# Patient Record
Sex: FEMALE | Race: BLACK OR AFRICAN AMERICAN | NOT HISPANIC OR LATINO | Employment: FULL TIME | URBAN - METROPOLITAN AREA
[De-identification: names, ages, dates, MRNs, and addresses within clinical notes are randomized per-mention and may not be internally consistent; named-entity substitution may affect disease eponyms.]

---

## 2018-10-15 ENCOUNTER — OFFICE VISIT (OUTPATIENT)
Dept: OBSTETRICS AND GYNECOLOGY | Facility: CLINIC | Age: 38
End: 2018-10-15

## 2018-10-15 VITALS
WEIGHT: 145 LBS | HEIGHT: 69 IN | DIASTOLIC BLOOD PRESSURE: 68 MMHG | BODY MASS INDEX: 21.48 KG/M2 | SYSTOLIC BLOOD PRESSURE: 120 MMHG

## 2018-10-15 DIAGNOSIS — Z01.419 WOMEN'S ANNUAL ROUTINE GYNECOLOGICAL EXAMINATION: Primary | ICD-10-CM

## 2018-10-15 PROCEDURE — 99395 PREV VISIT EST AGE 18-39: CPT | Performed by: OBSTETRICS & GYNECOLOGY

## 2018-10-15 NOTE — PROGRESS NOTES
"Subjective     Chief Complaint   Patient presents with   • Gynecologic Exam     Pap smear patient wants birth control       Deana Rios is a 38 y.o. year old  presenting to be seen for her annual exam.      Her LMP was Patient's last menstrual period was 10/04/2018 (exact date)..  Her cycles are regular, predictable and consistent every 28 - 32 days.  Usually her flow is typically normal with no more than 0 days of heavy flow each menses.   Additionally she describes pelvic pain (absent) associated with her menses.    She is sexually active.  In the past 12 months there have not been new sexual partners.  Condoms are not typically used.  She would not like to be screened for STD's at today's exam.     Current contraceptive methods being used: none.  In the coming year, she is considering changing her current contraceptive method.    She exercises regularly: yes.  She wears her seat belt: yes.  She has concerns about domestic violence: no.    GYN screening history:  · Last pap: she does not know when her last PAP was done and she reports her last PAP was normal  · Last mammogram: she has never had a mammogram.    No Additional Complaints Reported    The following portions of the patient's history were reviewed and updated as appropriate:vital signs, allergies, current medications, past medical history, past social history, past surgical history and problem list.    Review of Systems  Pertinent items are noted in HPI.     Physical Exam    Objective     /68   Ht 175.3 cm (69\")   Wt 65.8 kg (145 lb)   LMP 10/04/2018 (Exact Date)   Breastfeeding? No   BMI 21.41 kg/m²       General:  well developed; well nourished  no acute distress   Constitutional: healthy   Skin:  No suspicious lesions seen   Thyroid: normal to inspection and palpation   Lungs:  breathing is unlabored  clear to auscultation bilaterally   Heart:  regular rate and rhythm, S1, S2 normal, no murmur, click, rub or gallop   Breasts:  " Examined in supine position  Symmetric without masses or skin dimpling  Nipples normal without inversion, lesions or discharge  There are no palpable axillary nodes   Abdomen: soft, non-tender; no masses  no umbilical or inginual hernias are present  no hepato-splenomegaly   Pelvis: Clinical staff was present for exam  External genitalia:  normal appearance of the external genitalia including Bartholin's and Bl[place]'s glands.  :  urethral meatus normal; urethral hypermobility is absent.  Vaginal:  normal pink mucosa without prolapse or lesions.  Cervix:  normal appearance  Uterus:  normal size, shape and consistency retroverted;  Adnexa:  normal bimanual exam of the adnexa.   Musculoskeletal: negative   Neuro: normal without focal findings, mental status, speech normal, alert and oriented x3 and ROCIO   Psych: oriented to time, place and person, mood and affect are within normal limits, pt is a good historian; no memory problems were noted       Lab Review   No data reviewed    Imaging  No data reviewed    Assessment/Plan     ASSESSMENT  1. Women's annual routine gynecological examination        PLAN  No orders of the defined types were placed in this encounter.    No orders of the defined types were placed in this encounter.        Follow up: 1 year(s)   Considering Paragard IUD         This note was electronically signed.    Rich Burt MD  October 15, 2018

## 2018-11-16 RX ORDER — METRONIDAZOLE 500 MG/1
500 TABLET ORAL 3 TIMES DAILY
Qty: 21 TABLET | Refills: 0 | Status: SHIPPED | OUTPATIENT
Start: 2018-11-16 | End: 2018-11-23

## 2019-04-24 RX ORDER — METRONIDAZOLE 500 MG/1
500 TABLET ORAL 3 TIMES DAILY
Qty: 21 TABLET | Refills: 0 | Status: SHIPPED | OUTPATIENT
Start: 2019-04-24 | End: 2019-05-01

## 2021-04-08 ENCOUNTER — OFFICE VISIT (OUTPATIENT)
Dept: OBSTETRICS AND GYNECOLOGY | Facility: CLINIC | Age: 41
End: 2021-04-08

## 2021-04-08 VITALS
TEMPERATURE: 98 F | DIASTOLIC BLOOD PRESSURE: 68 MMHG | SYSTOLIC BLOOD PRESSURE: 120 MMHG | BODY MASS INDEX: 25.46 KG/M2 | HEIGHT: 68 IN | WEIGHT: 168 LBS

## 2021-04-08 DIAGNOSIS — Z01.419 WOMEN'S ANNUAL ROUTINE GYNECOLOGICAL EXAMINATION: Primary | ICD-10-CM

## 2021-04-08 DIAGNOSIS — Z12.39 BREAST SCREENING: ICD-10-CM

## 2021-04-08 PROCEDURE — 99396 PREV VISIT EST AGE 40-64: CPT | Performed by: OBSTETRICS & GYNECOLOGY

## 2021-04-08 NOTE — PROGRESS NOTES
Subjective     No chief complaint on file.      Deana Rios is a 40 y.o. year old  presenting to be seen for her annual exam.      Her LMP was Patient's last menstrual period was 2021..  Her cycles are regular, predictable and consistent every 28 - 32 days.  Usually her flow is typically normal with no more than 0 days of heavy flow each menses.   Additionally she describes no other symptoms associated with her menses.    She is sexually active.  In the past 12 months there have not been new sexual partners.  Condoms are not typically used.  She would not like to be screened for STD's at today's exam.     Current contraceptive methods being used: none.  In the coming year, she is not considering changing her current contraceptive method.    She exercises regularly: yes.  She wears her seat belt: yes.  She has concerns about domestic violence: no.  Health Maintenance, Female  Adopting a healthy lifestyle and getting preventive care are important in promoting health and wellness. Ask your health care provider about:  · The right schedule for you to have regular tests and exams.  · Things you can do on your own to prevent diseases and keep yourself healthy.  What should I know about diet, weight, and exercise?  Eat a healthy diet       · Eat a diet that includes plenty of vegetables, fruits, low-fat dairy products, and lean protein.  · Do not eat a lot of foods that are high in solid fats, added sugars, or sodium.  Maintain a healthy weight  Body mass index (BMI) is used to identify weight problems. It estimates body fat based on height and weight. Your health care provider can help determine your BMI and help you achieve or maintain a healthy weight.  Get regular exercise  Get regular exercise. This is one of the most important things you can do for your health. Most adults should:  · Exercise for at least 150 minutes each week. The exercise should increase your heart rate and make you sweat  (moderate-intensity exercise).  · Do strengthening exercises at least twice a week. This is in addition to the moderate-intensity exercise.  · Spend less time sitting. Even light physical activity can be beneficial.  Watch cholesterol and blood lipids  Have your blood tested for lipids and cholesterol at 20 years of age, then have this test every 5 years.  Have your cholesterol levels checked more often if:  · Your lipid or cholesterol levels are high.  · You are older than 40 years of age.  · You are at high risk for heart disease.  What should I know about cancer screening?  Depending on your health history and family history, you may need to have cancer screening at various ages. This may include screening for:  · Breast cancer.  · Cervical cancer.  · Colorectal cancer.  · Skin cancer.  · Lung cancer.  What should I know about heart disease, diabetes, and high blood pressure?  Blood pressure and heart disease  · High blood pressure causes heart disease and increases the risk of stroke. This is more likely to develop in people who have high blood pressure readings, are of  descent, or are overweight.  · Have your blood pressure checked:  ? Every 3-5 years if you are 18-39 years of age.  ? Every year if you are 40 years old or older.  Diabetes  Have regular diabetes screenings. This checks your fasting blood sugar level. Have the screening done:  · Once every three years after age 40 if you are at a normal weight and have a low risk for diabetes.  · More often and at a younger age if you are overweight or have a high risk for diabetes.  What should I know about preventing infection?  Hepatitis B  If you have a higher risk for hepatitis B, you should be screened for this virus. Talk with your health care provider to find out if you are at risk for hepatitis B infection.  Hepatitis C  Testing is recommended for:  · Everyone born from 1945 through 1965.  · Anyone with known risk factors for hepatitis  C.  Sexually transmitted infections (STIs)  · Get screened for STIs, including gonorrhea and chlamydia, if:  ? You are sexually active and are younger than 24 years of age.  ? You are older than 24 years of age and your health care provider tells you that you are at risk for this type of infection.  ? Your sexual activity has changed since you were last screened, and you are at increased risk for chlamydia or gonorrhea. Ask your health care provider if you are at risk.  · Ask your health care provider about whether you are at high risk for HIV. Your health care provider may recommend a prescription medicine to help prevent HIV infection. If you choose to take medicine to prevent HIV, you should first get tested for HIV. You should then be tested every 3 months for as long as you are taking the medicine.  Pregnancy  · If you are about to stop having your period (premenopausal) and you may become pregnant, seek counseling before you get pregnant.  · Take 400 to 800 micrograms (mcg) of folic acid every day if you become pregnant.  · Ask for birth control (contraception) if you want to prevent pregnancy.  Osteoporosis and menopause  Osteoporosis is a disease in which the bones lose minerals and strength with aging. This can result in bone fractures. If you are 65 years old or older, or if you are at risk for osteoporosis and fractures, ask your health care provider if you should:  · Be screened for bone loss.  · Take a calcium or vitamin D supplement to lower your risk of fractures.  · Be given hormone replacement therapy (HRT) to treat symptoms of menopause.  Follow these instructions at home:  Lifestyle  · Do not use any products that contain nicotine or tobacco, such as cigarettes, e-cigarettes, and chewing tobacco. If you need help quitting, ask your health care provider.  · Do not use street drugs.  · Do not share needles.  · Ask your health care provider for help if you need support or information about quitting  drugs.  Alcohol use  · Do not drink alcohol if:  ? Your health care provider tells you not to drink.  ? You are pregnant, may be pregnant, or are planning to become pregnant.  · If you drink alcohol:  ? Limit how much you use to 0-1 drink a day.  ? Limit intake if you are breastfeeding.  · Be aware of how much alcohol is in your drink. In the [place] one drink equals one 12 oz bottle of beer (355 mL), one 5 oz glass of wine (148 mL), or one 1½ oz glass of hard liquor (44 mL).  General instructions  · Schedule regular health, dental, and eye exams.  · Stay current with your vaccines.  · Tell your health care provider if:  ? You often feel depressed.  ? You have ever been abused or do not feel safe at home.  Summary  · Adopting a healthy lifestyle and getting preventive care are important in promoting health and wellness.  · Follow your health care provider's instructions about healthy diet, exercising, and getting tested or screened for diseases.  · Follow your health care provider's instructions on monitoring your cholesterol and blood pressure.  This information is not intended to replace advice given to you by your health care provider. Make sure you discuss any questions you have with your health care provider.    GYN screening history:  · Last pap: she reports her last PAP was normal  · Last mammogram: she has never had a mammogram  · Last fasting lipid profile: she reports her last lipid panel was normal  · Last 25-hydroxy vitamin D level: she does not know the results of her last Vitamin D level  · Last colonoscopy: she has never had a colonoscopy done  · Last DEXA: she has never had a DEXA.    No Additional Complaints Reported    The following portions of the patient's history were reviewed and updated as appropriate:vital signs, allergies, current medications, past medical history, past social history, past surgical history and problem list.    Review of Systems  Pertinent items are noted in HPI.     Physical  "Exam    Objective     /68   Temp 98 °F (36.7 °C)   Ht 172.7 cm (68\")   Wt 76.2 kg (168 lb)   LMP 04/04/2021   Breastfeeding No   BMI 25.54 kg/m²       General:  well developed; well nourished  no acute distress   Constitutional: healthy   Skin:  No suspicious lesions seen   Thyroid: normal to inspection and palpation   Lungs:  breathing is unlabored  clear to auscultation bilaterally   Heart:  regular rate and rhythm, S1, S2 normal, no murmur, click, rub or gallop   Breasts:  Examined in supine position  Symmetric without masses or skin dimpling  Nipples normal without inversion, lesions or discharge  There are no palpable axillary nodes   Abdomen: soft, non-tender; no masses  no umbilical or inginual hernias are present  no hepato-splenomegaly   Pelvis: Clinical staff was present for exam  External genitalia:  normal appearance of the external genitalia including Bartholin's and Strang's glands.  :  urethral meatus normal; urethral hypermobility is absent.  Vaginal:  normal pink mucosa without prolapse or lesions. blood present -  small amount and dark red;  Cervix:  normal appearance pap done  Uterus:  normal size, shape and consistency retroverted;  Adnexa:  normal bimanual exam of the adnexa.   Musculoskeletal: negative   Neuro: normal without focal findings, mental status, speech normal, alert and oriented x3 and ROCIO   Psych: oriented to time, place and person, mood and affect are within normal limits, pt is a good historian; no memory problems were noted       Lab Review   CBC, CMP and TSH    Imaging  No data reviewed    Assessment/Plan     ASSESSMENT  1. Women's annual routine gynecological examination    2. Breast screening        PLAN  Orders Placed This Encounter   Procedures   • Mammo Screening Digital Tomosynthesis Bilateral With CAD     Standing Status:   Future     Standing Expiration Date:   4/8/2022     Order Specific Question:   Reason for Exam:     Answer:   initial screning exam     " Order Specific Question:   Patient Pregnant     Answer:   No   • CBC (No Diff)     Order Specific Question:   Release to patient     Answer:   Immediate   • Cholesterol, Total     Order Specific Question:   Release to patient     Answer:   Immediate   • Comprehensive Metabolic Panel     Order Specific Question:   Release to patient     Answer:   Immediate   • Follicle Stimulating Hormone     Order Specific Question:   Release to patient     Answer:   Immediate   • Hemoglobin A1c     Order Specific Question:   Release to patient     Answer:   Immediate   • TSH     Order Specific Question:   Release to patient     Answer:   Immediate   • Vitamin D 25 Hydroxy     Order Specific Question:   Release to patient     Answer:   Immediate     No orders of the defined types were placed in this encounter.        Follow up: 1 year(s)         This note was electronically signed.    Rich Burt MD  April 8, 2021

## 2021-04-08 NOTE — PATIENT INSTRUCTIONS
Health Maintenance, Female  Adopting a healthy lifestyle and getting preventive care are important in promoting health and wellness. Ask your health care provider about:  · The right schedule for you to have regular tests and exams.  · Things you can do on your own to prevent diseases and keep yourself healthy.  What should I know about diet, weight, and exercise?  Eat a healthy diet    · Eat a diet that includes plenty of vegetables, fruits, low-fat dairy products, and lean protein.  · Do not eat a lot of foods that are high in solid fats, added sugars, or sodium.  Maintain a healthy weight  Body mass index (BMI) is used to identify weight problems. It estimates body fat based on height and weight. Your health care provider can help determine your BMI and help you achieve or maintain a healthy weight.  Get regular exercise  Get regular exercise. This is one of the most important things you can do for your health. Most adults should:  · Exercise for at least 150 minutes each week. The exercise should increase your heart rate and make you sweat (moderate-intensity exercise).  · Do strengthening exercises at least twice a week. This is in addition to the moderate-intensity exercise.  · Spend less time sitting. Even light physical activity can be beneficial.  Watch cholesterol and blood lipids  Have your blood tested for lipids and cholesterol at 20 years of age, then have this test every 5 years.  Have your cholesterol levels checked more often if:  · Your lipid or cholesterol levels are high.  · You are older than 40 years of age.  · You are at high risk for heart disease.  What should I know about cancer screening?  Depending on your health history and family history, you may need to have cancer screening at various ages. This may include screening for:  · Breast cancer.  · Cervical cancer.  · Colorectal cancer.  · Skin cancer.  · Lung cancer.  What should I know about heart disease, diabetes, and high blood  pressure?  Blood pressure and heart disease  · High blood pressure causes heart disease and increases the risk of stroke. This is more likely to develop in people who have high blood pressure readings, are of  descent, or are overweight.  · Have your blood pressure checked:  ? Every 3-5 years if you are 18-39 years of age.  ? Every year if you are 40 years old or older.  Diabetes  Have regular diabetes screenings. This checks your fasting blood sugar level. Have the screening done:  · Once every three years after age 40 if you are at a normal weight and have a low risk for diabetes.  · More often and at a younger age if you are overweight or have a high risk for diabetes.  What should I know about preventing infection?  Hepatitis B  If you have a higher risk for hepatitis B, you should be screened for this virus. Talk with your health care provider to find out if you are at risk for hepatitis B infection.  Hepatitis C  Testing is recommended for:  · Everyone born from 1945 through 1965.  · Anyone with known risk factors for hepatitis C.  Sexually transmitted infections (STIs)  · Get screened for STIs, including gonorrhea and chlamydia, if:  ? You are sexually active and are younger than 24 years of age.  ? You are older than 24 years of age and your health care provider tells you that you are at risk for this type of infection.  ? Your sexual activity has changed since you were last screened, and you are at increased risk for chlamydia or gonorrhea. Ask your health care provider if you are at risk.  · Ask your health care provider about whether you are at high risk for HIV. Your health care provider may recommend a prescription medicine to help prevent HIV infection. If you choose to take medicine to prevent HIV, you should first get tested for HIV. You should then be tested every 3 months for as long as you are taking the medicine.  Pregnancy  · If you are about to stop having your period (premenopausal) and  you may become pregnant, seek counseling before you get pregnant.  · Take 400 to 800 micrograms (mcg) of folic acid every day if you become pregnant.  · Ask for birth control (contraception) if you want to prevent pregnancy.  Osteoporosis and menopause  Osteoporosis is a disease in which the bones lose minerals and strength with aging. This can result in bone fractures. If you are 65 years old or older, or if you are at risk for osteoporosis and fractures, ask your health care provider if you should:  · Be screened for bone loss.  · Take a calcium or vitamin D supplement to lower your risk of fractures.  · Be given hormone replacement therapy (HRT) to treat symptoms of menopause.  Follow these instructions at home:  Lifestyle  · Do not use any products that contain nicotine or tobacco, such as cigarettes, e-cigarettes, and chewing tobacco. If you need help quitting, ask your health care provider.  · Do not use street drugs.  · Do not share needles.  · Ask your health care provider for help if you need support or information about quitting drugs.  Alcohol use  · Do not drink alcohol if:  ? Your health care provider tells you not to drink.  ? You are pregnant, may be pregnant, or are planning to become pregnant.  · If you drink alcohol:  ? Limit how much you use to 0-1 drink a day.  ? Limit intake if you are breastfeeding.  · Be aware of how much alcohol is in your drink. In the [place] one drink equals one 12 oz bottle of beer (355 mL), one 5 oz glass of wine (148 mL), or one 1½ oz glass of hard liquor (44 mL).  General instructions  · Schedule regular health, dental, and eye exams.  · Stay current with your vaccines.  · Tell your health care provider if:  ? You often feel depressed.  ? You have ever been abused or do not feel safe at home.  Summary  · Adopting a healthy lifestyle and getting preventive care are important in promoting health and wellness.  · Follow your health care provider's instructions about healthy  diet, exercising, and getting tested or screened for diseases.  · Follow your health care provider's instructions on monitoring your cholesterol and blood pressure.  This information is not intended to replace advice given to you by your health care provider. Make sure you discuss any questions you have with your health care provider.  Document Revised: 12/11/2019 Document Reviewed: 12/11/2019  [employer] Patient Education © 2021 [employer]    Cancer Screening for Women  A cancer screening is a test or exam that checks for cancer. Your health care provider will recommend specific cancer screenings based on your age, medical history (including risk factors), and family history of cancer. Work with your health care provider to create a cancer screening schedule that protects your health.  Who should have screening?  All women should be considered for screening of certain cancers, including breast cancer, cervical cancer, colorectal cancer, and skin cancer. Your health care provider may recommend screenings for other types of cancer if:  · You had cancer before.  · You have a family member with cancer.  · You have abnormal genes that could increase the risk of cancer.  · You have risk factors for certain cancers, such as current or past use of tobacco products, or being overweight.  When you should be screened for cancer depends on:  · Your age.  · Your medical history and your family's medical history.  · Certain lifestyle factors, such as smoking or other use of tobacco products.  · Environmental exposure, such as to asbestos.  How is screening done?  Breast cancer  Breast cancer screening is done with a test that takes images of breast tissue (mammogram). Here are some screening guidelines for women at average risk:  · When you are age 40-44, you will be given the choice to start having mammograms.  · When you are age 45-54, you should have a mammogram every year.  · You may start having mammograms before age 45 if  you have risk factors for breast cancer, such as having an immediate family member with breast cancer.  · At age 55 or older, you should have a mammogram every 1-2 years for as long as you are in good health and have a life expectancy of 10 years or longer.  · It is important to know what your breasts look and feel like so you can report any changes to your health care provider.    Cervical cancer  Cervical cancer screening is done with a Pap test. This testchecks for abnormalities in the lowest part of the uterus (cervix). This test may be performed along with an HPV (human papillomavirus) test to identify the virus that causes cervical cancer. To perform the test, a health care provider takes a swab of cells from yourcervix during a pelvic exam. Screening for cervical cancer with a Pap test should start at age 21. Here are some screening guidelines:  · When you are age 21-29, you should have a Pap test every 3 years.  · When you are age 30-65, you should have a Pap test and HPV test every 5 years or have a Pap test every 3 years.  · If you have risk factors for cervical cancer, you may be screened for cervical cancer more often.  · If results of your Pap tests are abnormal, you may have an HPV test.  · If you have had the HPV vaccine, you will still be screened for cervical cancer and follow normal screening recommendations.  You do not need to be screened for cervical cancer if any of the following apply to you:  · You are older than age 65 and you have not had a serious cervical pre-cancer or cancer in the last 20 years.  · Your cervix and uterus have been removed, and you have never had cervical cancer or abnormal cells that could become cancer (precancerous cells).  Colorectal cancer    All adults at average risk should be considered for screening for colorectal cancer starting at age 50 and continuing until age 75. Your health care provider may recommend screening at age 45 if you are at higher risk due to  family history of colon or rectal cancer or other risk factors. You will have tests every 1-10 years, depending on your results and the type of screening test. Talk with your health care provider about which screening test is right for you and how often you should be screened.  Colorectal cancer screening looks for cancer or for growths called polyps that often form before cancer starts. Tests to look for cancer or polyps include:  · Colonoscopy or flexible sigmoidoscopy. For these procedures, a flexible tube with a small camera is inserted into the rectum.  · CT colonography. This test uses X-rays and a contrast dye to check the colon for polyps. If a polyp is found, you may need to have a colonoscopy so the polyp can be located and removed.  Tests to look for cancer in the stool (feces) include:  · Guaiac-based fecal occult blood test (FOBT). This test can find blood in stool. It can be done at home with a kit.  · Fecal immunochemical test (FIT). This test can find blood in stool. For this test, you will need to collect stool samples at home.  · Stool DNA test. This test looks for blood in stool and any changes in DNA that can lead to colon cancer. For this test, you will need to collect a stool sample at home and send it to a lab.    Endometrial cancer  There is no standard screening test for endometrial cancer, and women at average risk do not routinely need to have this screening. Talk with your health care provider about whether screening is right for you. If it is, this screening is performed through:  · Endometrial tissue biopsy. This tests a sample of tissue taken from the lining of the uterus.  · Vaginal ultrasound.  If you are at increased risk for endometrial cancer, you may need to have these tests more often than normal. You are at increased risk if:  · You have a family history of ovarian, uterine, or colon cancer.  · You are taking tamoxifen, a medicine used to treat breast cancer.  · You have  certain types of colon cancer.  If you have reached menopause, it is especially important to talk with your health care provider about any vaginal bleeding or spotting. Screening for endometrial cancer is not recommended for women who do not have symptoms of the cancer, such as vaginal bleeding.  Lung cancer  Lung cancer screening is done with a CT scan that looks for abnormal changes in the lungs. Discuss lung cancer screening with your health care provider if you are age 55-74 and if any of the following apply to you:  · You currently smoke.  · You used to smoke heavily.  · You have a smoking history of 1 pack of cigarettes a day for 30 years or 2 packs a day for 15 years.  · You have quit smoking within the past 15 years.  You may need to be screened every year if you smoke heavily or if you used to smoke.  Skin cancer  Skin cancer screening is done by checking the skin for unusual moles or spots and any changes in existing moles. Your health care provider should check your skin for signs of skin cancer at every physical exam. You should check your skin every month and tell your health care provider right away if anything looks unusual. Women with a higher-than-normal risk for skin cancer may want to see a skin specialist (dermatologist) for an annual body check.  What are the benefits of screening?  Cancer screening is done to look for cancer in the very early stages, before it spreads and becomes harder to treat and before you would start to notice symptoms. Finding cancer early improves the chances of successful treatment. It may save your life.  Where to find more information  · American Cancer Society: www.cancer.org  · Centers for Disease Control and Prevention: www.cdc.gov  · National Cancer [place]: www.cancer.gov  · [place] Department of Health and Human Services: www.womenshealth.gov  Contact a health care provider if:  You have concerns about any signs or symptoms of cancer. These may include:  · Skin  problems. You may have:  ? Moles of an unusual shape or color.  ? Changes in existing moles.  ? A sore on your skin that does not heal.  · Tiredness (fatigue) that does not go away.  · Losing weight without trying.  · Blood in your urine or stool.  · Problems with coughing or breathing. These may include:  ? Coughing or trouble breathing that does not go away.  ? Coughing up blood.  · Lumps or other changes in your breasts.  · Vaginal bleeding, spotting, or changes in your periods.  · Frequent pain or cramping in your abdomen.  Summary  · Your health care provider will recommend specific cancer screenings based on your age, medical history, and family history of cancer.  · Work with your health care provider to create a cancer screening schedule that protects your health.  · Finding cancer early improves the chances of successful treatment. It may save your life.  · Contact a health care provider if you have concerns about any signs or symptoms of cancer.  This information is not intended to replace advice given to you by your health care provider. Make sure you discuss any questions you have with your health care provider.  Document Revised: 11/13/2020 Document Reviewed: 11/13/2020  [employer] Patient Education © 2021 [employer]

## 2021-04-12 DIAGNOSIS — Z01.419 WOMEN'S ANNUAL ROUTINE GYNECOLOGICAL EXAMINATION: ICD-10-CM

## 2022-04-11 ENCOUNTER — OFFICE VISIT (OUTPATIENT)
Dept: OBSTETRICS AND GYNECOLOGY | Facility: CLINIC | Age: 42
End: 2022-04-11

## 2022-04-11 ENCOUNTER — LAB (OUTPATIENT)
Dept: LAB | Facility: HOSPITAL | Age: 42
End: 2022-04-11

## 2022-04-11 VITALS
BODY MASS INDEX: 27.28 KG/M2 | WEIGHT: 180 LBS | DIASTOLIC BLOOD PRESSURE: 80 MMHG | HEIGHT: 68 IN | SYSTOLIC BLOOD PRESSURE: 130 MMHG

## 2022-04-11 DIAGNOSIS — Z01.419 WOMEN'S ANNUAL ROUTINE GYNECOLOGICAL EXAMINATION: Primary | ICD-10-CM

## 2022-04-11 DIAGNOSIS — Z30.09 FAMILY PLANNING: ICD-10-CM

## 2022-04-11 DIAGNOSIS — Z12.39 BREAST SCREENING: ICD-10-CM

## 2022-04-11 LAB
ALBUMIN SERPL-MCNC: 4.6 G/DL (ref 3.5–5.2)
ALBUMIN/GLOB SERPL: 1.5 G/DL
ALP SERPL-CCNC: 53 U/L (ref 39–117)
ALT SERPL W P-5'-P-CCNC: 14 U/L (ref 1–33)
ANION GAP SERPL CALCULATED.3IONS-SCNC: 6 MMOL/L (ref 5–15)
AST SERPL-CCNC: 19 U/L (ref 1–32)
BILIRUB SERPL-MCNC: 0.3 MG/DL (ref 0–1.2)
BUN SERPL-MCNC: 7 MG/DL (ref 6–20)
BUN/CREAT SERPL: 7.4 (ref 7–25)
CALCIUM SPEC-SCNC: 9.1 MG/DL (ref 8.6–10.5)
CHLORIDE SERPL-SCNC: 103 MMOL/L (ref 98–107)
CHOLEST SERPL-MCNC: 162 MG/DL (ref 0–200)
CO2 SERPL-SCNC: 27 MMOL/L (ref 22–29)
CREAT SERPL-MCNC: 0.95 MG/DL (ref 0.57–1)
DEPRECATED RDW RBC AUTO: 41.2 FL (ref 37–54)
EGFRCR SERPLBLD CKD-EPI 2021: 76.9 ML/MIN/1.73
ERYTHROCYTE [DISTWIDTH] IN BLOOD BY AUTOMATED COUNT: 13.9 % (ref 12.3–15.4)
GLOBULIN UR ELPH-MCNC: 3 GM/DL
GLUCOSE SERPL-MCNC: 101 MG/DL (ref 65–99)
HBA1C MFR BLD: 5.8 % (ref 4.8–5.6)
HCT VFR BLD AUTO: 36.4 % (ref 34–46.6)
HGB BLD-MCNC: 12 G/DL (ref 12–15.9)
MCH RBC QN AUTO: 27.3 PG (ref 26.6–33)
MCHC RBC AUTO-ENTMCNC: 33 G/DL (ref 31.5–35.7)
MCV RBC AUTO: 82.9 FL (ref 79–97)
PLATELET # BLD AUTO: 266 10*3/MM3 (ref 140–450)
PMV BLD AUTO: 11.8 FL (ref 6–12)
POTASSIUM SERPL-SCNC: 4.1 MMOL/L (ref 3.5–5.2)
PROT SERPL-MCNC: 7.6 G/DL (ref 6–8.5)
RBC # BLD AUTO: 4.39 10*6/MM3 (ref 3.77–5.28)
SODIUM SERPL-SCNC: 136 MMOL/L (ref 136–145)
T4 FREE SERPL-MCNC: 1.07 NG/DL (ref 0.93–1.7)
TSH SERPL DL<=0.05 MIU/L-ACNC: 0.85 UIU/ML (ref 0.27–4.2)
WBC NRBC COR # BLD: 4.37 10*3/MM3 (ref 3.4–10.8)

## 2022-04-11 PROCEDURE — 84443 ASSAY THYROID STIM HORMONE: CPT | Performed by: OBSTETRICS & GYNECOLOGY

## 2022-04-11 PROCEDURE — 84439 ASSAY OF FREE THYROXINE: CPT | Performed by: OBSTETRICS & GYNECOLOGY

## 2022-04-11 PROCEDURE — 36415 COLL VENOUS BLD VENIPUNCTURE: CPT | Performed by: OBSTETRICS & GYNECOLOGY

## 2022-04-11 PROCEDURE — 85027 COMPLETE CBC AUTOMATED: CPT | Performed by: OBSTETRICS & GYNECOLOGY

## 2022-04-11 PROCEDURE — 82465 ASSAY BLD/SERUM CHOLESTEROL: CPT | Performed by: OBSTETRICS & GYNECOLOGY

## 2022-04-11 PROCEDURE — 99396 PREV VISIT EST AGE 40-64: CPT | Performed by: OBSTETRICS & GYNECOLOGY

## 2022-04-11 PROCEDURE — 83036 HEMOGLOBIN GLYCOSYLATED A1C: CPT | Performed by: OBSTETRICS & GYNECOLOGY

## 2022-04-11 PROCEDURE — 80053 COMPREHEN METABOLIC PANEL: CPT | Performed by: OBSTETRICS & GYNECOLOGY

## 2022-04-11 NOTE — PROGRESS NOTES
Subjective     Chief Complaint   Patient presents with   • Annual Exam     Women's annual gyn exam       Deana Rios is a 41 y.o. year old  presenting to be seen for her annual exam.      Her LMP was Patient's last menstrual period was 2022 (approximate)..  Her cycles are regular, predictable and consistent every 28 - 32 days.  Usually her flow is typically normal with no more than 0 days of heavy flow each menses.   Additionally she describes no other symptoms associated with her menses.    She is sexually active.  In the past 12 months there have not been new sexual partners.  Condoms are not typically used.  She would not like to be screened for STD's at today's exam.     Current contraceptive methods being used: none.  In the coming year, she is considering changing her current contraceptive method.    She exercises regularly: yes.  She wears her seat belt: yes.  She has concerns about domestic violence: no.  Health Maintenance, Female  Adopting a healthy lifestyle and getting preventive care are important in promoting health and wellness. Ask your health care provider about:  · The right schedule for you to have regular tests and exams.  · Things you can do on your own to prevent diseases and keep yourself healthy.  What should I know about diet, weight, and exercise?  Eat a healthy diet    · Eat a diet that includes plenty of vegetables, fruits, low-fat dairy products, and lean protein.  · Do not eat a lot of foods that are high in solid fats, added sugars, or sodium.     Maintain a healthy weight  Body mass index (BMI) is used to identify weight problems. It estimates body fat based on height and weight. Your health care provider can help determine your BMI and help you achieve or maintain a healthy weight.  Get regular exercise  Get regular exercise. This is one of the most important things you can do for your health. Most adults should:  · Exercise for at least 150 minutes each week. The  exercise should increase your heart rate and make you sweat (moderate-intensity exercise).  · Do strengthening exercises at least twice a week. This is in addition to the moderate-intensity exercise.  · Spend less time sitting. Even light physical activity can be beneficial.  Watch cholesterol and blood lipids  Have your blood tested for lipids and cholesterol at 20 years of age, then have this test every 5 years.  Have your cholesterol levels checked more often if:  · Your lipid or cholesterol levels are high.  · You are older than 40 years of age.  · You are at high risk for heart disease.  What should I know about cancer screening?  Depending on your health history and family history, you may need to have cancer screening at various ages. This may include screening for:  · Breast cancer.  · Cervical cancer.  · Colorectal cancer.  · Skin cancer.  · Lung cancer.  What should I know about heart disease, diabetes, and high blood pressure?  Blood pressure and heart disease  · High blood pressure causes heart disease and increases the risk of stroke. This is more likely to develop in people who have high blood pressure readings, are of  descent, or are overweight.  · Have your blood pressure checked:  ? Every 3-5 years if you are 18-39 years of age.  ? Every year if you are 40 years old or older.  Diabetes  Have regular diabetes screenings. This checks your fasting blood sugar level. Have the screening done:  · Once every three years after age 40 if you are at a normal weight and have a low risk for diabetes.  · More often and at a younger age if you are overweight or have a high risk for diabetes.  What should I know about preventing infection?  Hepatitis B  If you have a higher risk for hepatitis B, you should be screened for this virus. Talk with your health care provider to find out if you are at risk for hepatitis B infection.  Hepatitis C  Testing is recommended for:  · Everyone born from 1945 through  1965.  · Anyone with known risk factors for hepatitis C.  Sexually transmitted infections (STIs)  · Get screened for STIs, including gonorrhea and chlamydia, if:  ? You are sexually active and are younger than 24 years of age.  ? You are older than 24 years of age and your health care provider tells you that you are at risk for this type of infection.  ? Your sexual activity has changed since you were last screened, and you are at increased risk for chlamydia or gonorrhea. Ask your health care provider if you are at risk.  · Ask your health care provider about whether you are at high risk for HIV. Your health care provider may recommend a prescription medicine to help prevent HIV infection. If you choose to take medicine to prevent HIV, you should first get tested for HIV. You should then be tested every 3 months for as long as you are taking the medicine.  Pregnancy  · If you are about to stop having your period (premenopausal) and you may become pregnant, seek counseling before you get pregnant.  · Take 400 to 800 micrograms (mcg) of folic acid every day if you become pregnant.  · Ask for birth control (contraception) if you want to prevent pregnancy.  Osteoporosis and menopause  Osteoporosis is a disease in which the bones lose minerals and strength with aging. This can result in bone fractures. If you are 65 years old or older, or if you are at risk for osteoporosis and fractures, ask your health care provider if you should:  · Be screened for bone loss.  · Take a calcium or vitamin D supplement to lower your risk of fractures.  · Be given hormone replacement therapy (HRT) to treat symptoms of menopause.  Follow these instructions at home:  Lifestyle  · Do not use any products that contain nicotine or tobacco, such as cigarettes, e-cigarettes, and chewing tobacco. If you need help quitting, ask your health care provider.  · Do not use street drugs.  · Do not share needles.  · Ask your health care provider for  help if you need support or information about quitting drugs.  Alcohol use  · Do not drink alcohol if:  ? Your health care provider tells you not to drink.  ? You are pregnant, may be pregnant, or are planning to become pregnant.  · If you drink alcohol:  ? Limit how much you use to 0-1 drink a day.  ? Limit intake if you are breastfeeding.  · Be aware of how much alcohol is in your drink. In the [place] one drink equals one 12 oz bottle of beer (355 mL), one 5 oz glass of wine (148 mL), or one 1½ oz glass of hard liquor (44 mL).  General instructions  · Schedule regular health, dental, and eye exams.  · Stay current with your vaccines.  · Tell your health care provider if:  ? You often feel depressed.  ? You have ever been abused or do not feel safe at home.  Summary  · Adopting a healthy lifestyle and getting preventive care are important in promoting health and wellness.  · Follow your health care provider's instructions about healthy diet, exercising, and getting tested or screened for diseases.  · Follow your health care provider's instructions on monitoring your cholesterol and blood pressure.    GYN screening history:  · Last pap: she reports her last PAP was normal  · Last mammogram: she has never had a mammogram  · Last fasting lipid profile: she has never had a lipid panel done  · Last 25-hydroxy vitamin D level: she has never had her Vitamin D level checked  · Last colonoscopy: she has never had a colonoscopy done  · Last DEXA: she has never had a DEXA.    No Additional Complaints Reported    The following portions of the patient's history were reviewed and updated as appropriate:vital signs, allergies, current medications, past medical history, past social history, past surgical history and problem list.    Review of Systems  A comprehensive review of systems was negative except for: Constitutional: positive for unexplained weight gain     Physical Exam    Objective     /80 (BP Location: Right arm,  "Patient Position: Sitting, Cuff Size: Adult)   Ht 172.7 cm (68\")   Wt 81.6 kg (180 lb)   LMP 03/28/2022 (Approximate)   Breastfeeding No   BMI 27.37 kg/m²       General:  well developed; well nourished  no acute distress   Constitutional: healthy   Skin:  No suspicious lesions seen   Thyroid: normal to inspection and palpation   Lungs:  breathing is unlabored  clear to auscultation bilaterally   Heart:  regular rate and rhythm, S1, S2 normal, no murmur, click, rub or gallop   Breasts:  Examined in supine position  Symmetric without masses or skin dimpling  Nipples normal without inversion, lesions or discharge  There are no palpable axillary nodes   Abdomen: soft, non-tender; no masses  no umbilical or inginual hernias are present  no hepato-splenomegaly   Pelvis: Clinical staff was present for exam  External genitalia:  normal appearance of the external genitalia including Bartholin's and [place]'s glands.  :  urethral meatus normal; urethral hypermobility is absent.  Vaginal:  normal pink mucosa without prolapse or lesions.  Cervix:  normal appearance  Uterus:  normal size, shape and consistency retroverted;  Adnexa:  normal bimanual exam of the adnexa.   Musculoskeletal: negative   Neuro: normal without focal findings, mental status, speech normal, alert and oriented x3 and ROCIO   Psych: oriented to time, place and person, mood and affect are within normal limits, pt is a good historian; no memory problems were noted       Lab Review   No data reviewed    Imaging  No data reviewed    Assessment/Plan     ASSESSMENT  1. Women's annual routine gynecological examination    2. Breast screening    3. Family planning Discussed contraception options with emphasis on LARC's. Will consider the ParaGard IUD       PLAN  Orders Placed This Encounter   Procedures   • Mammo Screening Digital Tomosynthesis Bilateral With CAD     Standing Status:   Future     Standing Expiration Date:   4/11/2023     Order Specific " Question:   Reason for Exam:     Answer:   screen     Order Specific Question:   Patient Pregnant     Answer:   No   • CBC (No Diff)     Order Specific Question:   Release to patient     Answer:   Immediate   • Cholesterol, Total     Order Specific Question:   Release to patient     Answer:   Immediate   • Comprehensive Metabolic Panel     Order Specific Question:   Release to patient     Answer:   Immediate   • Hemoglobin A1c     Order Specific Question:   Release to patient     Answer:   Immediate   • T4, Free     Order Specific Question:   Release to patient     Answer:   Immediate   • TSH     Order Specific Question:   Release to patient     Answer:   Immediate     No orders of the defined types were placed in this encounter.        Follow up: 1 year(s) or with the onset of menses should she desire IUD placment         This note was electronically signed.    Rich Burt MD  April 11, 2022

## 2022-04-18 ENCOUNTER — OFFICE VISIT (OUTPATIENT)
Dept: INTERNAL MEDICINE | Facility: CLINIC | Age: 42
End: 2022-04-18

## 2022-04-18 VITALS
HEIGHT: 68 IN | TEMPERATURE: 97.8 F | WEIGHT: 182.4 LBS | SYSTOLIC BLOOD PRESSURE: 134 MMHG | BODY MASS INDEX: 27.65 KG/M2 | DIASTOLIC BLOOD PRESSURE: 84 MMHG | HEART RATE: 68 BPM

## 2022-04-18 DIAGNOSIS — Z11.59 NEED FOR HEPATITIS C SCREENING TEST: ICD-10-CM

## 2022-04-18 DIAGNOSIS — Z13.220 LIPID SCREENING: ICD-10-CM

## 2022-04-18 DIAGNOSIS — K59.04 CHRONIC IDIOPATHIC CONSTIPATION: ICD-10-CM

## 2022-04-18 DIAGNOSIS — E66.3 OVERWEIGHT (BMI 25.0-29.9): ICD-10-CM

## 2022-04-18 DIAGNOSIS — R63.5 WEIGHT GAIN: ICD-10-CM

## 2022-04-18 DIAGNOSIS — R73.03 PREDIABETES: Primary | Chronic | ICD-10-CM

## 2022-04-18 PROBLEM — K59.00 CONSTIPATION: Status: ACTIVE | Noted: 2022-04-18

## 2022-04-18 PROCEDURE — 99204 OFFICE O/P NEW MOD 45 MIN: CPT | Performed by: STUDENT IN AN ORGANIZED HEALTH CARE EDUCATION/TRAINING PROGRAM

## 2022-04-18 NOTE — PROGRESS NOTES
"Chief Complaint  Deana Rios is a 41 y.o. female presenting for Hyperglycemia (Establish care) and Weight Gain (excessive).     From [facility]. Lived in [place] since 2003. . Has boyfriend (since 2017) - they don't live together.She has 4 boys from previous relationship (Born [id number]). Works as  for [employer]. Has bachelors degree in biology from [employer]    Patient has a past medical history of prediabetes (4/2022)    History of Present Illness  Patient is here to [facility], she has been following regularly with OB/GYN.    She was just diagnosed with prediabetes with A1c of 5.8.  She also has gained about 12-14 pounds over the last year despite not eating more and despite being very physically active.  She exercises and runs regularly up to 4-5 miles daily.    She is overall in good health.  She does report some infrequency of bowel movements, she can go 4-5 days between bowel movements.    The following portions of the patient's history were reviewed and updated as appropriate: allergies, current medications, past family history, past medical history, past social history, past surgical history and problem list.    Objective  /84 (BP Location: Left arm, Patient Position: Sitting, Cuff Size: Large Adult)   Pulse 68   Temp 97.8 °F (36.6 °C) (Temporal)   Ht 171.5 cm (67.5\")   Wt 82.7 kg (182 lb 6.4 oz)   LMP 03/28/2022 (Approximate)   BMI 28.15 kg/m²     Physical Exam  Vitals reviewed.   Constitutional:       Appearance: Normal appearance.   HENT:      Head: Normocephalic and atraumatic.      Nose: Nose normal. No congestion.      Mouth/Throat:      Mouth: Mucous membranes are moist.   Eyes:      Extraocular Movements: Extraocular movements intact.      Conjunctiva/sclera: Conjunctivae normal.   Cardiovascular:      Rate and Rhythm: Normal rate and regular rhythm.      Heart sounds: Normal heart sounds. No murmur heard.  Pulmonary:      Effort: " Pulmonary effort is normal.      Breath sounds: Normal breath sounds.   Abdominal:      General: There is no distension.      Palpations: Abdomen is soft. There is no mass.      Tenderness: There is no abdominal tenderness.   Musculoskeletal:         General: No swelling.      Cervical back: Neck supple.   Skin:     General: Skin is warm and dry.   Neurological:      Mental Status: She is alert and oriented to person, place, and time. Mental status is at baseline.   Psychiatric:         Behavior: Behavior normal.         Thought Content: Thought content normal.         Assessment/Plan   1. Prediabetes  Hemoglobin A1C   Date Value Ref Range Status   04/11/2022 5.80 (H) 4.80 - 5.60 % Final   New diagnosis of prediabetes.  Discussed increased risk of developing diabetes.  Patient would like to see nutrition for counseling.  He also is interested in trying metformin for prevention.  Counseled on side effects including GI side effects with nausea and diarrhea.  - metFORMIN (Glucophage) 500 MG tablet; Take 1 tablet by mouth 2 (Two) Times a Day With Meals.  Dispense: 60 tablet; Refill: 2  - Ambulatory Referral to Nutrition Services    2. Weight gain  Patient will return for morning cortisol to rule out hypercortisolism.  Thyroid function is normal.  - Cortisol; Future  - Ambulatory Referral to Nutrition Services    3. Chronic idiopathic constipation  Recommend trial of over-the-counter MiraLAX stool softener.    4. Lipid screening  Patient will do fasting lipids on lab draw  - Lipid Panel; Future    5. Need for hepatitis C screening test  We will screen as recommended  - Hepatitis C Antibody; Future    6. Overweight (BMI 25.0-29.9)  BMI is above normal parameters. Recommendations: exercise counseling/recommendations and nutrition counseling/recommendations   - Ambulatory Referral to Nutrition Services      Return in about 2 months (around 6/18/2022) for Annual physical.    Chauncey Rodrigez MD  Family  Medicine  04/18/2022

## 2022-04-19 DIAGNOSIS — R73.09 ELEVATED HEMOGLOBIN A1C: Primary | ICD-10-CM

## 2022-06-02 ENCOUNTER — HOSPITAL ENCOUNTER (OUTPATIENT)
Dept: MAMMOGRAPHY | Facility: HOSPITAL | Age: 42
Discharge: HOME | End: 2022-06-02
Admitting: OBSTETRICS & GYNECOLOGY

## 2022-06-02 DIAGNOSIS — Z01.419 WOMEN'S ANNUAL ROUTINE GYNECOLOGICAL EXAMINATION: ICD-10-CM

## 2022-06-02 PROCEDURE — 77067 SCR MAMMO BI INCL CAD: CPT | Performed by: RADIOLOGY

## 2022-06-02 PROCEDURE — 77067 SCR MAMMO BI INCL CAD: CPT

## 2022-06-02 PROCEDURE — 77063 BREAST TOMOSYNTHESIS BI: CPT

## 2022-06-02 PROCEDURE — 77063 BREAST TOMOSYNTHESIS BI: CPT | Performed by: RADIOLOGY

## 2022-06-03 ENCOUNTER — HOSPITAL ENCOUNTER (OUTPATIENT)
Dept: NUTRITION | Facility: HOSPITAL | Age: 42
Setting detail: RECURRING SERIES
Discharge: HOME | End: 2022-06-03

## 2022-06-03 PROCEDURE — 97802 MEDICAL NUTRITION INDIV IN: CPT

## 2022-06-03 NOTE — CONSULTS
Adult Outpatient Nutrition    Patient Name:  Deana Rios  YOB: 1980  MRN: [id number]    Assessment Date:  6/3/2022    Comments:      This medical referred consult was provided in office. Consent for treatment was given verbally.    Patient attended their scheduled pre-diabetes nutrition visit.  Please see media tab for assessment and notes if you use EP[employer]. If you are not an EP[employer] user a copy of patient's assessment and notes will be sent per routine. Thank you.               Electronically signed by:  Susi Stringer RD  06/03/22 15:59 EDT

## 2022-09-02 ENCOUNTER — TELEPHONE (OUTPATIENT)
Dept: OBSTETRICS AND GYNECOLOGY | Facility: CLINIC | Age: 42
End: 2022-09-02

## 2022-09-02 NOTE — TELEPHONE ENCOUNTER
Call to patient left message to call office.  Patient needs to call mammography for follow up diagnotic mammogram

## 2023-03-12 ENCOUNTER — APPOINTMENT (OUTPATIENT)
Dept: GENERAL RADIOLOGY | Facility: HOSPITAL | Age: 43
End: 2023-03-12
Payer: COMMERCIAL

## 2023-03-12 ENCOUNTER — HOSPITAL ENCOUNTER (EMERGENCY)
Facility: HOSPITAL | Age: 43
Discharge: HOME | End: 2023-03-12
Attending: EMERGENCY MEDICINE | Admitting: EMERGENCY MEDICINE
Payer: COMMERCIAL

## 2023-03-12 VITALS
WEIGHT: 190 LBS | TEMPERATURE: 98.8 F | RESPIRATION RATE: 18 BRPM | HEIGHT: 69 IN | SYSTOLIC BLOOD PRESSURE: 158 MMHG | OXYGEN SATURATION: 100 % | HEART RATE: 78 BPM | DIASTOLIC BLOOD PRESSURE: 106 MMHG | BODY MASS INDEX: 28.14 KG/M2

## 2023-03-12 DIAGNOSIS — S83.91XA SPRAIN OF RIGHT KNEE, UNSPECIFIED LIGAMENT, INITIAL ENCOUNTER: Primary | ICD-10-CM

## 2023-03-12 PROCEDURE — 73560 X-RAY EXAM OF KNEE 1 OR 2: CPT

## 2023-03-12 PROCEDURE — 99283 EMERGENCY DEPT VISIT LOW MDM: CPT

## 2023-03-12 NOTE — DISCHARGE INSTRUCTIONS
Rest.  Elevate and apply ice bag off/on as needed.  Take OTC Motrin or Aleve as directed for pain.  Follow up with Dr. Hernandez (orthopedist) if pain persists.

## 2023-03-12 NOTE — ED PROVIDER NOTES
Subjective   History of Present Illness  Ms. Rios is a 42-year-old female who presents to the emergency department with complaints of right knee pain after a slip and fall at a restaurant last night.  The patient states that she slipped in a small puddle of water and twisted her knee as she went down.  She did not land directly on the knee.  She has had increased pain on weightbearing and range of motion since the fall.  No wounds.  The patient notes that she had previous meniscus repair to the same knee years ago.  She has had no ongoing issues with the knee.  She is without other complaint.  She denies any other injury as a result of the fall.        Review of Systems   Constitutional: Negative for chills and fever.   HENT: Negative for sore throat.    Respiratory: Negative for cough and shortness of breath.    Cardiovascular: Negative for chest pain and palpitations.   Gastrointestinal: Negative for abdominal pain, nausea and vomiting.   Genitourinary: Negative for dysuria.   Musculoskeletal: Positive for arthralgias (Right knee pain). Negative for back pain and neck pain.   Skin: Negative for wound.   Neurological: Negative for numbness and headaches.   Hematological: Negative.    Psychiatric/Behavioral: Negative.        Past Medical History:   Diagnosis Date   • Asthma    • Asthma    • Headache    • Pap smear abnormality of cervix     surgery to remove cancer from uterus   • Prediabetes 04/18/2022   • Sleep apnea    • Urinary tract infection        No Known Allergies    Past Surgical History:   Procedure Laterality Date   • KNEE ARTHROPLASTY     • LASER ABLATION         Family History   Problem Relation Age of Onset   • Kidney disease Mother    • Diabetes Mother    • Arthritis Mother    • Hypertension Mother    • Diabetes Father    • Diabetes Sister    • Hypertension Sister    • Migraines Sister    • Hypertension Brother    • Diabetes Brother    • Cancer Maternal Grandmother    • Breast cancer Maternal  Grandmother 67   • Diabetes Maternal Grandmother    • Diabetes Paternal Grandmother    • Cancer Maternal Aunt    • Arthritis Maternal Aunt    • Hypertension Maternal Aunt    • Arthritis Paternal Aunt    • Stroke Paternal Grandfather    • Ovarian cancer Neg Hx        Social History     Socioeconomic History   • Marital status: Single   • Number of children: 4   Tobacco Use   • Smoking status: Never   • Smokeless tobacco: Never   Vaping Use   • Vaping Use: Never used   Substance and Sexual Activity   • Alcohol use: Yes     Comment: 3 drinks weekly   • Drug use: Never   • Sexual activity: Yes     Birth control/protection: None           Objective   Physical Exam  Constitutional:       General: She is not in acute distress.     Appearance: Normal appearance.   HENT:      Head: Normocephalic and atraumatic.      Nose: Nose normal.      Mouth/Throat:      Mouth: Mucous membranes are moist.   Eyes:      Pupils: Pupils are equal, round, and reactive to light.   Cardiovascular:      Rate and Rhythm: Normal rate and regular rhythm.      Pulses: Normal pulses.      Heart sounds: Normal heart sounds.   Pulmonary:      Effort: Pulmonary effort is normal.      Breath sounds: Normal breath sounds.   Abdominal:      Tenderness: There is no abdominal tenderness.   Musculoskeletal:      Cervical back: Normal range of motion and neck supple.      Comments: Moderate tenderness on palpation of the anterior and lateral aspect of the right knee.  No swelling or bruising or deformity.  Increased pain on range of motion.   Skin:     General: Skin is warm and dry.   Neurological:      General: No focal deficit present.      Mental Status: She is alert and oriented to person, place, and time.   Psychiatric:         Mood and Affect: Mood normal.         Procedures           ED Course      In summary, 42-year-old female with no known health issues presents to the emergency department with right knee pain after a slip and fall at a restaurant  last night.  She sustained a twisting action to the right knee.  She has had previous meniscus repair to the same knee years ago.  She denies any other injury.    MDM: Differential includes knee contusion, fracture, ligamentous or cartilaginous injury, sprain etc.    Patient will be sent for x-ray of the right knee.    Right knee xray:  No acute fracture or traumatic malalignment identified    I spoke with pt about her xrays.  I will have the RN place a knee immobilizer and d/c to f/u with ortho if symptoms persist.                                         MDM    Final diagnoses:   Sprain of right knee, unspecified ligament, initial encounter       ED Disposition  ED Disposition     ED Disposition   Discharge    Condition   Stable    Comment   --             Dany Hernandez MD  [id number] [id number] [phone]      call for follow up in office if symptoms persist         Medication List      No changes were made to your prescriptions during this visit.          Scott Ramey, PA  03/12/23 [id number]

## 2024-06-18 ENCOUNTER — LAB (OUTPATIENT)
Dept: LAB | Facility: HOSPITAL | Age: 44
End: 2024-06-18
Payer: COMMERCIAL

## 2024-06-18 ENCOUNTER — OFFICE VISIT (OUTPATIENT)
Dept: INTERNAL MEDICINE | Facility: CLINIC | Age: 44
End: 2024-06-18
Payer: COMMERCIAL

## 2024-06-18 VITALS
SYSTOLIC BLOOD PRESSURE: 158 MMHG | DIASTOLIC BLOOD PRESSURE: 98 MMHG | BODY MASS INDEX: 28.94 KG/M2 | WEIGHT: 184.4 LBS | HEART RATE: 64 BPM | HEIGHT: 67 IN | TEMPERATURE: 97.7 F

## 2024-06-18 DIAGNOSIS — Z13.21 ENCOUNTER FOR VITAMIN DEFICIENCY SCREENING: ICD-10-CM

## 2024-06-18 DIAGNOSIS — R92.8 ABNORMAL MAMMOGRAM OF RIGHT BREAST: ICD-10-CM

## 2024-06-18 DIAGNOSIS — Z12.11 SCREEN FOR COLON CANCER: ICD-10-CM

## 2024-06-18 DIAGNOSIS — I10 ESSENTIAL HYPERTENSION: Chronic | ICD-10-CM

## 2024-06-18 DIAGNOSIS — E66.3 OVERWEIGHT (BMI 25.0-29.9): ICD-10-CM

## 2024-06-18 DIAGNOSIS — Z13.220 LIPID SCREENING: ICD-10-CM

## 2024-06-18 DIAGNOSIS — Z00.00 WELL ADULT EXAM: Primary | ICD-10-CM

## 2024-06-18 DIAGNOSIS — R73.03 PREDIABETES: Chronic | ICD-10-CM

## 2024-06-18 DIAGNOSIS — Z11.59 NEED FOR HEPATITIS C SCREENING TEST: ICD-10-CM

## 2024-06-18 PROBLEM — R73.09 ELEVATED HEMOGLOBIN A1C: Status: RESOLVED | Noted: 2022-04-19 | Resolved: 2024-06-18

## 2024-06-18 LAB
ALBUMIN SERPL-MCNC: 3.9 G/DL (ref 3.5–5.2)
ALBUMIN/GLOB SERPL: 1.2 G/DL
ALP SERPL-CCNC: 49 U/L (ref 39–117)
ALT SERPL W P-5'-P-CCNC: 15 U/L (ref 1–33)
ANION GAP SERPL CALCULATED.3IONS-SCNC: 9.4 MMOL/L (ref 5–15)
AST SERPL-CCNC: 15 U/L (ref 1–32)
BILIRUB SERPL-MCNC: 0.3 MG/DL (ref 0–1.2)
BUN SERPL-MCNC: 8 MG/DL (ref 6–20)
BUN/CREAT SERPL: 8 (ref 7–25)
CALCIUM SPEC-SCNC: 9.2 MG/DL (ref 8.6–10.5)
CHLORIDE SERPL-SCNC: 104 MMOL/L (ref 98–107)
CHOLEST SERPL-MCNC: 162 MG/DL (ref 0–200)
CO2 SERPL-SCNC: 24.6 MMOL/L (ref 22–29)
CREAT SERPL-MCNC: 1 MG/DL (ref 0.57–1)
EGFRCR SERPLBLD CKD-EPI 2021: 71.4 ML/MIN/1.73
EXPIRATION DATE: ABNORMAL
GLOBULIN UR ELPH-MCNC: 3.3 GM/DL
GLUCOSE SERPL-MCNC: 97 MG/DL (ref 65–99)
HBA1C MFR BLD: 6 % (ref 4.5–5.7)
HCV AB SER QL: NORMAL
HDLC SERPL-MCNC: 54 MG/DL (ref 40–60)
LDLC SERPL CALC-MCNC: 94 MG/DL (ref 0–100)
LDLC/HDLC SERPL: 1.74 {RATIO}
Lab: ABNORMAL
POTASSIUM SERPL-SCNC: 4.3 MMOL/L (ref 3.5–5.2)
PROT SERPL-MCNC: 7.2 G/DL (ref 6–8.5)
SODIUM SERPL-SCNC: 138 MMOL/L (ref 136–145)
TRIGL SERPL-MCNC: 70 MG/DL (ref 0–150)
TSH SERPL DL<=0.05 MIU/L-ACNC: 1.1 UIU/ML (ref 0.27–4.2)
VIT B12 BLD-MCNC: 474 PG/ML (ref 211–946)
VLDLC SERPL-MCNC: 14 MG/DL (ref 5–40)

## 2024-06-18 PROCEDURE — 99214 OFFICE O/P EST MOD 30 MIN: CPT | Performed by: STUDENT IN AN ORGANIZED HEALTH CARE EDUCATION/TRAINING PROGRAM

## 2024-06-18 PROCEDURE — 99396 PREV VISIT EST AGE 40-64: CPT | Performed by: STUDENT IN AN ORGANIZED HEALTH CARE EDUCATION/TRAINING PROGRAM

## 2024-06-18 PROCEDURE — 80061 LIPID PANEL: CPT

## 2024-06-18 PROCEDURE — 82607 VITAMIN B-12: CPT

## 2024-06-18 PROCEDURE — 83036 HEMOGLOBIN GLYCOSYLATED A1C: CPT | Performed by: STUDENT IN AN ORGANIZED HEALTH CARE EDUCATION/TRAINING PROGRAM

## 2024-06-18 PROCEDURE — 86803 HEPATITIS C AB TEST: CPT

## 2024-06-18 PROCEDURE — 80053 COMPREHEN METABOLIC PANEL: CPT

## 2024-06-18 PROCEDURE — 84443 ASSAY THYROID STIM HORMONE: CPT

## 2024-06-18 RX ORDER — METFORMIN HYDROCHLORIDE 500 MG/1
500 TABLET, EXTENDED RELEASE ORAL
Qty: 30 TABLET | Refills: 5 | Status: SHIPPED | OUTPATIENT
Start: 2024-06-18 | End: 2024-06-21 | Stop reason: SDUPTHER

## 2024-06-18 RX ORDER — NIFEDIPINE 30 MG/1
30 TABLET, EXTENDED RELEASE ORAL DAILY
Qty: 30 TABLET | Refills: 2 | Status: SHIPPED | OUTPATIENT
Start: 2024-06-18 | End: 2024-06-21 | Stop reason: SDUPTHER

## 2024-06-18 NOTE — PROGRESS NOTES
"Chief Complaint  Deana Rios is a 43 y.o. female presenting for Annual Exam.     From [facility]. Has beenj living in [place] since 2003. . Has boyfriend (since 2017) - they don't live together. She has 4 boys from previous relationship (Born [id number]). Works as  for [employer]. Has bachelors degree in biology from [employer]     Patient has a past medical history of hypertension (2023) and prediabetes (4/2022)    History of Present Illness  ***    The following portions of the patient's history were reviewed and updated as appropriate: {history reviewed:20406::\"allergies\",\"current medications\",\"past family history\",\"past medical history\",\"past social history\",\"past surgical history\",\"problem list\"}.    Objective  BP (!) 124/106 (BP Location: Left arm, Patient Position: Sitting, Cuff Size: Large Adult)   Pulse 64   Temp 97.7 °F (36.5 °C) (Temporal)   Ht 170.2 cm (67\")   Wt 83.6 kg (184 lb 6.4 oz)   BMI 28.88 kg/m²     Physical Exam    Assessment/Plan   1. Well adult exam  ***    2. Prediabetes  ***  - POC Glycosylated Hemoglobin (Hb A1C)      No follow-ups on file.    Chauncey Rodrigez MD  Family Medicine  06/18/2024    "

## 2024-06-18 NOTE — PROGRESS NOTES
Chief Complaint  Deana Rios is a 43 y.o. female presenting for Annual Exam.     From [facility]. Has beenj living in [place] since 2003. . Has boyfriend (since 2017) - they don't live together. She has 4 boys from previous relationship (Born [phone]). Works as  for [employer]. Has bachelors degree in biology from [employer]     Patient has a past medical history of hypertension (2023) and prediabetes (4/2022)    History of Present Illness  Patient is here for annual physical and follow-up.  She also needs form for her physical today, but was not able to print out.  She will drop it by later.    Patient remains very physically active, she goes to the gym 5 days weekly and exercises for 1 hour and 20 minutes.  She feels he has little bit of excess body fat, but does not want to lose weight, she prefers to maintain weight.    Patient did have mammogram in 2022 that showed abnormal finding of the right breast.  She did not follow-up with diagnostic mammogram as recommended, and she is amenable to doing mammogram.    She was diagnosed with prediabetes, but did not start metformin.  She had heard about side effects and decided not to start.  She is amenable to trying at this point.    She reports blood pressures have been fairly good at home in the past, but is not sure about the exact levels.    Patient has no family history of colorectal cancer or polyps.  She is interested in colon cancer screening colonoscopy when she turns 44 at the end of this month.    She has not seen OB/GYN recently, but she plans to follow-up for Pap smear.    She goes to the dentist twice yearly.    The following portions of the patient's history were reviewed and updated as appropriate: allergies, current medications, past family history, past medical history, past social history, past surgical history, and problem list.    Objective  /98 (BP Location: Left arm, Patient Position:  "Sitting, Cuff Size: Large Adult)   Pulse 64   Temp 97.7 °F (36.5 °C) (Temporal)   Ht 170.2 cm (67\")   Wt 83.6 kg (184 lb 6.4 oz)   BMI 28.88 kg/m²     Physical Exam  Vitals reviewed.   Constitutional:       Appearance: Normal appearance.      Comments: Patient has athletic body habitus, small amount of excess body fat.   HENT:      Head: Normocephalic and atraumatic.      Right Ear: Tympanic membrane, ear canal and external ear normal. There is no impacted cerumen.      Left Ear: Tympanic membrane, ear canal and external ear normal. There is no impacted cerumen.      Nose: Nose normal. No congestion.      Mouth/Throat:      Mouth: Mucous membranes are moist.   Eyes:      Extraocular Movements: Extraocular movements intact.      Conjunctiva/sclera: Conjunctivae normal.   Cardiovascular:      Rate and Rhythm: Normal rate and regular rhythm.      Heart sounds: Normal heart sounds. No murmur heard.  Pulmonary:      Effort: Pulmonary effort is normal. No respiratory distress.      Breath sounds: Normal breath sounds. No wheezing.   Abdominal:      General: There is no distension.      Palpations: Abdomen is soft. There is no mass.      Tenderness: There is no abdominal tenderness.   Musculoskeletal:      Cervical back: Neck supple.      Right lower leg: No edema.      Left lower leg: No edema.   Skin:     General: Skin is warm and dry.   Neurological:      Mental Status: She is alert and oriented to person, place, and time. Mental status is at baseline.   Psychiatric:         Behavior: Behavior normal.         Thought Content: Thought content normal.         Assessment/Plan   1. Well adult exam  Counseled on recommendations for annual flu vaccine, COVID booster and tetanus/Tdap vaccine with whooping cough every 10 years.  Unfortunately we are out of Tdap, she can come in at any point for administration.  Counseled on recommendation for breast cancer screening mammograms starting at age 40, she also has abnormal " mammogram 2022 and is overdue for follow-up with diagnostic mammogram.  Counseled on recommendations for colorectal cancer screening starting at age 45.  She has no family history.  She prefers colonoscopy to stool testing.  Counseled on recommendations for cervical cancer screening with Pap smear every 3-5 years depending on results and history.  She plans to schedule appointment with her OB/GYN.  Discussed recommendations for sexually transmitted disease screening, patient has no concerns and declines testing today.    2. Essential hypertension  BP Readings from Last 3 Encounters:   06/18/24 158/98   03/12/23 (!) 158/106   04/18/22 134/84   Patient meets criteria for hypertension.  He is anxious and there could be a component of whitecoat hypertension, however there are multiple readings with elevated levels, and she also has 2 siblings with hypertension.  Patient is amenable to starting on nifedipine.  This is usually well-tolerated, if blood pressure drops significantly she might experience some lightheadedness.  If significant side effects she should let me know.  Also nifedipine can sometimes cause mild swelling of the ankles, but usually not at lower dose.  We will do blood work as ordered.  She is fasting today.  - Comprehensive Metabolic Panel; Future  - TSH Rfx On Abnormal To Free T4; Future  - NIFEdipine XL (PROCARDIA XL) 30 MG 24 hr tablet; Take 1 tablet by mouth Daily.  Dispense: 30 tablet; Refill: 2    3. Prediabetes  Hemoglobin A1C   Date Value Ref Range Status   06/18/2024 6.0 (A) 4.5 - 5.7 % Final   04/11/2022 5.80 (H) 4.80 - 5.60 % Final   Slight worsening of her blood sugar levels.  Will do trial of low-dose metformin extended release and see if she tolerates  - POC Glycosylated Hemoglobin (Hb A1C)  - metFORMIN ER (GLUCOPHAGE-XR) 500 MG 24 hr tablet; Take 1 tablet by mouth Daily With Breakfast.  Dispense: 30 tablet; Refill: 5    4. Abnormal mammogram of right breast  Strongly recommend diagnostic  mammogram to make sure there is no development since her last imaging in 2022.  - Mammo Diagnostic Digital Tomosynthesis Bilateral With CAD; Future  - US Breast Right Limited; Future    5. Screen for colon cancer  - Ambulatory Referral For Screening Colonoscopy    6. Lipid screening  - Lipid Panel; Future    7. Encounter for vitamin deficiency screening  - Vitamin B12; Future    8. Need for hepatitis C screening test  - Hepatitis C Antibody; Future    9. Overweight (BMI 25.0-29.9)  BMI is >= 25 and <30. (Overweight) The following options were offered after discussion;: exercise counseling/recommendations and nutrition counseling/recommendations  Based on her athletic body habitus, she is close to normal/ideal weight.  She has minimal amounts of body fat and may not be considered overweight for this reason.      Return in about 6 weeks (around 7/30/2024) for Recheck.    Future Appointments         Provider Department Center    7/30/2024 9:00 AM Chauncey Rodrigez MD [facility] [place]              Chauncey Rodrigez MD  Family Medicine  06/18/2024

## 2024-06-21 DIAGNOSIS — I10 ESSENTIAL HYPERTENSION: Chronic | ICD-10-CM

## 2024-06-21 DIAGNOSIS — R73.03 PREDIABETES: Chronic | ICD-10-CM

## 2024-06-21 RX ORDER — METFORMIN HYDROCHLORIDE 500 MG/1
500 TABLET, EXTENDED RELEASE ORAL
Qty: 30 TABLET | Refills: 5 | Status: SHIPPED | OUTPATIENT
Start: 2024-06-21

## 2024-06-21 RX ORDER — NIFEDIPINE 30 MG/1
30 TABLET, EXTENDED RELEASE ORAL DAILY
Qty: 30 TABLET | Refills: 5 | Status: SHIPPED | OUTPATIENT
Start: 2024-06-21

## 2024-07-08 RX ORDER — SODIUM PICOSULFATE, MAGNESIUM OXIDE, AND ANHYDROUS CITRIC ACID 10; 3.5; 12 MG/160ML; G/160ML; G/160ML
350 LIQUID ORAL TAKE AS DIRECTED
Qty: 350 ML | Refills: 0 | Status: SHIPPED | OUTPATIENT
Start: 2024-07-08

## 2024-07-10 ENCOUNTER — HOSPITAL ENCOUNTER (OUTPATIENT)
Facility: HOSPITAL | Age: 44
Discharge: HOME | End: 2024-07-10
Payer: COMMERCIAL

## 2024-07-10 DIAGNOSIS — R92.8 ABNORMAL MAMMOGRAM OF RIGHT BREAST: ICD-10-CM

## 2024-07-10 PROCEDURE — 77062 BREAST TOMOSYNTHESIS BI: CPT | Performed by: RADIOLOGY

## 2024-07-10 PROCEDURE — G0279 TOMOSYNTHESIS, MAMMO: HCPCS

## 2024-07-10 PROCEDURE — 77066 DX MAMMO INCL CAD BI: CPT | Performed by: RADIOLOGY

## 2024-07-10 PROCEDURE — 76642 ULTRASOUND BREAST LIMITED: CPT | Performed by: RADIOLOGY

## 2024-07-10 PROCEDURE — 77066 DX MAMMO INCL CAD BI: CPT

## 2024-07-10 PROCEDURE — 76642 ULTRASOUND BREAST LIMITED: CPT

## 2024-07-12 ENCOUNTER — OUTSIDE FACILITY SERVICE (OUTPATIENT)
Dept: GASTROENTEROLOGY | Facility: CLINIC | Age: 44
End: 2024-07-12
Payer: COMMERCIAL

## 2024-07-12 PROCEDURE — 88305 TISSUE EXAM BY PATHOLOGIST: CPT | Performed by: INTERNAL MEDICINE

## 2024-07-12 PROCEDURE — 45385 COLONOSCOPY W/LESION REMOVAL: CPT | Performed by: INTERNAL MEDICINE

## 2024-07-12 PROCEDURE — 45380 COLONOSCOPY AND BIOPSY: CPT | Performed by: INTERNAL MEDICINE

## 2024-07-15 ENCOUNTER — LAB REQUISITION (OUTPATIENT)
Dept: LAB | Facility: HOSPITAL | Age: 44
End: 2024-07-15
Payer: COMMERCIAL

## 2024-07-15 DIAGNOSIS — D12.5 BENIGN NEOPLASM OF SIGMOID COLON: ICD-10-CM

## 2024-07-15 DIAGNOSIS — K64.8 OTHER HEMORRHOIDS: ICD-10-CM

## 2024-07-15 DIAGNOSIS — Z12.11 ENCOUNTER FOR SCREENING FOR MALIGNANT NEOPLASM OF COLON: ICD-10-CM

## 2024-07-15 DIAGNOSIS — D12.8 BENIGN NEOPLASM OF RECTUM: ICD-10-CM

## 2024-07-17 LAB — REF LAB TEST METHOD: NORMAL

## 2024-08-09 ENCOUNTER — OFFICE VISIT (OUTPATIENT)
Dept: INTERNAL MEDICINE | Facility: CLINIC | Age: 44
End: 2024-08-09
Payer: COMMERCIAL

## 2024-08-09 VITALS
DIASTOLIC BLOOD PRESSURE: 86 MMHG | HEART RATE: 60 BPM | HEIGHT: 67 IN | BODY MASS INDEX: 28.75 KG/M2 | TEMPERATURE: 98.4 F | SYSTOLIC BLOOD PRESSURE: 138 MMHG | WEIGHT: 183.2 LBS

## 2024-08-09 DIAGNOSIS — R73.03 PREDIABETES: Chronic | ICD-10-CM

## 2024-08-09 DIAGNOSIS — I10 ESSENTIAL HYPERTENSION: Primary | Chronic | ICD-10-CM

## 2024-08-09 DIAGNOSIS — Z23 NEED FOR DIPHTHERIA-TETANUS-PERTUSSIS (TDAP) VACCINE: ICD-10-CM

## 2024-08-09 PROCEDURE — 90471 IMMUNIZATION ADMIN: CPT | Performed by: STUDENT IN AN ORGANIZED HEALTH CARE EDUCATION/TRAINING PROGRAM

## 2024-08-09 PROCEDURE — 90715 TDAP VACCINE 7 YRS/> IM: CPT | Performed by: STUDENT IN AN ORGANIZED HEALTH CARE EDUCATION/TRAINING PROGRAM

## 2024-08-09 PROCEDURE — 99214 OFFICE O/P EST MOD 30 MIN: CPT | Performed by: STUDENT IN AN ORGANIZED HEALTH CARE EDUCATION/TRAINING PROGRAM

## 2024-08-09 NOTE — PROGRESS NOTES
"Chief Complaint  Deana Rios is a 44 y.o. female presenting for Hypertension.     From [facility]. Has beenj living in [place] since 2003. . Has boyfriend (since 2017) - they don't live together. She has 4 boys from previous relationship (Born [phone]). Works as  for [employer]. Has bachelors degree in biology from [employer]     Patient has a past medical history of hypertension (2023) and prediabetes (4/2022)    History of Present Illness  Patient is here for follow-up.    She did try metformin, but had significant side effects, bloating and loose bowels.  She would rather not take it anymore with the side effects.  She has made some dietary changes, cutting back on sweets.  She remains very physically active and exercises, goes to the gym.    She did have family members check her blood pressure, they said it was good, but she does not know the numbers.  She is tolerating nifedipine.  She feels she has a component of anxiety contributing to her elevated blood pressure in the office.    The following portions of the patient's history were reviewed and updated as appropriate: allergies, current medications, past family history, past medical history, past social history, past surgical history, and problem list.    Objective  /86 (BP Location: Right arm, Patient Position: Sitting, Cuff Size: Large Adult)   Pulse 60   Temp 98.4 °F (36.9 °C) (Temporal)   Ht 170.2 cm (67.01\")   Wt 83.1 kg (183 lb 3.2 oz)   BMI 28.69 kg/m²     Physical Exam  Constitutional:       Appearance: Normal appearance.   HENT:      Head: Normocephalic and atraumatic.   Eyes:      Extraocular Movements: Extraocular movements intact.      Conjunctiva/sclera: Conjunctivae normal.   Pulmonary:      Effort: Pulmonary effort is normal. No respiratory distress.   Musculoskeletal:      Cervical back: Normal range of motion and neck supple.   Skin:     General: Skin is warm and dry. "   Neurological:      Mental Status: She is alert and oriented to person, place, and time. Mental status is at baseline.   Psychiatric:         Mood and Affect: Mood is anxious. Mood is not depressed.         Behavior: Behavior normal.         Thought Content: Thought content normal.         Assessment/Plan   1. Essential hypertension  BP Readings from Last 3 Encounters:   08/09/24 138/86   06/18/24 158/98   03/12/23 (!) 158/106   Blood pressure is improved, still borderline high, but I suspect component of whitecoat hypertension.  Continue on nifedipine XL 30 mg for now.  I have encouraged her to check blood pressures at home when she is relaxed.  Depending on her levels at home we can see if she needs more medication in the future.  Follow-up in November.  Will do blood work before the visit  - Comprehensive Metabolic Panel; Future    2. Prediabetes  Hemoglobin A1C   Date Value Ref Range Status   06/18/2024 6.0 (A) 4.5 - 5.7 % Final   04/11/2022 5.80 (H) 4.80 - 5.60 % Final   Did not tolerate metformin due to GI side effects.  Will recheck in November.  Potentially consider other medication if needed  - Hemoglobin A1c; Future    3. Need for diphtheria-tetanus-pertussis (Tdap) vaccine  Administered today  - Tdap Vaccine Greater Than or Equal To 6yo IM           Return in about 15 weeks (around 11/22/2024) for Recheck.    Future Appointments         Provider Department Center    11/15/2024 11:30 AM Chauncey Rodrigez MD [facility] [place]              Chauncey Rodrigez MD  Family Medicine  08/09/2024